# Patient Record
Sex: FEMALE | Employment: FULL TIME | URBAN - METROPOLITAN AREA
[De-identification: names, ages, dates, MRNs, and addresses within clinical notes are randomized per-mention and may not be internally consistent; named-entity substitution may affect disease eponyms.]

---

## 2020-01-21 ENCOUNTER — HOSPITAL ENCOUNTER (OUTPATIENT)
Dept: URGENT CARE | Facility: CLINIC | Age: 45
Discharge: HOME | End: 2020-01-21

## 2020-01-24 LAB — BACTERIA SPEC AEROBE CULT: NORMAL

## 2020-09-01 ENCOUNTER — OFFICE VISIT CONVERTED (OUTPATIENT)
Dept: SURGERY | Facility: CLINIC | Age: 45
End: 2020-09-01
Attending: SURGERY

## 2020-11-23 ENCOUNTER — OFFICE VISIT CONVERTED (OUTPATIENT)
Dept: GASTROENTEROLOGY | Facility: CLINIC | Age: 45
End: 2020-11-23
Attending: NURSE PRACTITIONER

## 2021-04-09 ENCOUNTER — OFFICE VISIT (OUTPATIENT)
Dept: OBSTETRICS AND GYNECOLOGY | Facility: CLINIC | Age: 46
End: 2021-04-09

## 2021-04-09 VITALS — DIASTOLIC BLOOD PRESSURE: 80 MMHG | WEIGHT: 142 LBS | HEART RATE: 60 BPM | SYSTOLIC BLOOD PRESSURE: 118 MMHG

## 2021-04-09 DIAGNOSIS — N95.1 HOT FLASHES DUE TO MENOPAUSE: Primary | ICD-10-CM

## 2021-04-09 LAB — TSH SERPL DL<=0.005 MIU/L-ACNC: 2.28 UIU/ML (ref 0.27–4.2)

## 2021-04-09 PROCEDURE — 99204 OFFICE O/P NEW MOD 45 MIN: CPT | Performed by: OBSTETRICS & GYNECOLOGY

## 2021-04-09 RX ORDER — MULTIPLE VITAMINS W/ MINERALS TAB 9MG-400MCG
1 TAB ORAL DAILY
COMMUNITY

## 2021-04-09 RX ORDER — ESTROGEN,CON/M-PROGEST ACET 0.3-1.5MG
1 TABLET ORAL DAILY
Qty: 84 TABLET | Refills: 0 | Status: SHIPPED | OUTPATIENT
Start: 2021-04-09 | End: 2021-06-14 | Stop reason: SDUPTHER

## 2021-04-09 NOTE — PROGRESS NOTES
Chief Complaint  Hot Flashes- pt here to talk about menopause    Subjective          Howard Kenney presents to [facility]  History of Present Illness  Patient is a 45-year-old -0-1-1 female who presents as a new patient to discuss possible menopause.  Patient reports a history of regular menstrual cycles and then last summer she went approximately 3 months without a cycle.  She states that she was given a course of hormones by her primary care provider and subsequently had bleeding for 2 months.  She states she has now not had a period since November.  She also reports hot flashes that are daily and occur multiple times a day.  She states they have significantly worsened over the last several months.  She denies any family history of early menopause.  She has no major medical problems.  She is currently up-to-date with her screening Pap smear and mammogram.    Objective   Vital Signs:   /80   Pulse 60   Wt 64.4 kg (142 lb)     Physical Exam  Vitals reviewed.   Constitutional:       Appearance: Normal appearance.   Cardiovascular:      Rate and Rhythm: Normal rate and regular rhythm.   Pulmonary:      Effort: Pulmonary effort is normal.      Breath sounds: Normal breath sounds.   Neurological:      General: No focal deficit present.      Mental Status: She is alert. Mental status is at baseline.   Psychiatric:         Mood and Affect: Mood normal.         Behavior: Behavior normal.     Review of Systems   Endocrine: Positive for heat intolerance.   Genitourinary: Positive for menstrual problem.   All other systems reviewed and are negative.                Assessment and Plan    Diagnoses and all orders for this visit:    1. Hot flashes due to menopause (Primary)  -     Follicle Stimulating Hormone  -     Estradiol  -     TSH Rfx On Abnormal To Free T4  -     estrogen, conjugated,-medroxyprogesterone (Prempro) 0.3-1.5 MG per tablet; Take 1 tablet by mouth Daily.  Dispense: 84  tablet; Refill: 0    Patient's clinical symptoms do seem consistent with perimenopause.  The perimenopausal stage was discussed with her and explained that she is considered young for menopause.  Discussed hormone replacement therapy for treatment of hot flashes and that it will also help with bone health.  She was counseled on risk of hormone replacement therapy including risk of blood clots, heart attack, and stroke, but she is low risk for these adverse effects.  Labs have been ordered for further evaluation and she would like to start on hormone replacement therapy.  Prescription for Prempro was sent and she was advised to follow-up in 2 months.      Follow Up   Return in about 2 months (around 6/9/2021) for Recheck.  Patient was given instructions and counseling regarding her condition or for health maintenance advice. Please see specific information pulled into the AVS if appropriate.

## 2021-04-09 NOTE — PATIENT INSTRUCTIONS
Perimenopause    Perimenopause is the normal time of life before and after menstrual periods stop completely (menopause). Perimenopause can begin 2-8 years before menopause, and it usually lasts for 1 year after menopause. During perimenopause, the ovaries may or may not produce an egg.  What are the causes?  This condition is caused by a natural change in hormone levels that happens as you get older.  What increases the risk?  This condition is more likely to start at an earlier age if you have certain medical conditions or treatments, including:  · A tumor of the pituitary gland in the brain.  · A disease that affects the ovaries and hormone production.  · Radiation treatment for cancer.  · Certain cancer treatments, such as chemotherapy or hormone (anti-estrogen) therapy.  · Heavy smoking and excessive alcohol use.  · Family history of early menopause.  What are the signs or symptoms?  Perimenopausal changes affect each woman differently. Symptoms of this condition may include:  · Hot flashes.  · Night sweats.  · Irregular menstrual periods.  · Decreased sex drive.  · Vaginal dryness.  · Headaches.  · Mood swings.  · Depression.  · Memory problems or trouble concentrating.  · Irritability.  · Tiredness.  · Weight gain.  · Anxiety.  · Trouble getting pregnant.  How is this diagnosed?  This condition is diagnosed based on your medical history, a physical exam, your age, your menstrual history, and your symptoms. Hormone tests may also be done.  How is this treated?  In some cases, no treatment is needed. You and your health care provider should make a decision together about whether treatment is necessary. Treatment will be based on your individual condition and preferences. Various treatments are available, such as:  · Menopausal hormone therapy (MHT).  · Medicines to treat specific symptoms.  · Acupuncture.  · Vitamin or herbal supplements.  Before starting treatment, make sure to let your health care provider  know if you have a personal or family history of:  · Heart disease.  · Breast cancer.  · Blood clots.  · Diabetes.  · Osteoporosis.  Follow these instructions at home:  Lifestyle  · Do not use any products that contain nicotine or tobacco, such as cigarettes and e-cigarettes. If you need help quitting, ask your health care provider.  · Eat a balanced diet that includes fresh fruits and vegetables, whole grains, soybeans, eggs, lean meat, and low-fat dairy.  · Get at least 30 minutes of physical activity on 5 or more days each week.  · Avoid alcoholic and caffeinated beverages, as well as spicy foods. This may help prevent hot flashes.  · Get 7-8 hours of sleep each night.  · Dress in layers that can be removed to help you manage hot flashes.  · Find ways to manage stress, such as deep breathing, meditation, or journaling.  General instructions  · Keep track of your menstrual periods, including:  ? When they occur.  ? How heavy they are and how long they last.  ? How much time passes between periods.  · Keep track of your symptoms, noting when they start, how often you have them, and how long they last.  · Take over-the-counter and prescription medicines only as told by your health care provider.  · Take vitamin supplements only as told by your health care provider. These may include calcium, vitamin E, and vitamin D.  · Use vaginal lubricants or moisturizers to help with vaginal dryness and improve comfort during sex.  · Talk with your health care provider before starting any herbal supplements.  · Keep all follow-up visits as told by your health care provider. This is important. This includes any group therapy or counseling.  Contact a health care provider if:  · You have heavy vaginal bleeding or pass blood clots.  · Your period lasts more than 2 days longer than normal.  · Your periods are recurring sooner than 21 days.  · You bleed after having sex.  Get help right away if:  · You have chest pain, trouble  breathing, or trouble talking.  · You have severe depression.  · You have pain when you urinate.  · You have severe headaches.  · You have vision problems.  Summary  · Perimenopause is the time when a woman's body begins to move into menopause. This may happen naturally or as a result of other health problems or medical treatments.  · Perimenopause can begin 2-8 years before menopause, and it usually lasts for 1 year after menopause.  · Perimenopausal symptoms can be managed through medicines, lifestyle changes, and complementary therapies such as acupuncture.  This information is not intended to replace advice given to you by your health care provider. Make sure you discuss any questions you have with your health care provider.  Document Revised: 11/30/2018 Document Reviewed: 01/23/2018  Elsevier Patient Education © 2021 [employer] Inc[employer]

## 2021-04-10 LAB
ESTRADIOL SERPL-MCNC: 27.6 PG/ML
FSH SERPL-ACNC: 107 MIU/ML

## 2021-05-10 NOTE — H&P
History and Physical      Patient Name: Howard Kenney   Patient ID: [id number]   Sex: Female   YOB: 1975    Referring Provider: Jay Larson MD    Visit Date: 2020    Provider: COURT Gentile   Location: [facility]   Location Address: [address] [place] [address]  [place]  [id number]   Location Phone: [phone]          Chief Complaint  · Hepatic hemangiomas      History Of Present Illness  The patient is a 45 year old female, who presents on referral from Jay Larson MD, for a gastroenterology evaluation for hepatic hemangiomas.      45-year-old female presents today for the evaluation of hepatic hemangiomas.  MRI abdomen with and without contrast on 10/2020 revealed incidental hepatic lesions seen on recent MR corresponding to benign hemangiomas with multiple benign hemangioma seen.  Radiologist recommends no further imaging follow-up necessary.  There is background of fatty infiltration of the liver.  Patient reports her last liver enzymes were within normal limits.  She denies right upper quadrant pain, nausea, vomiting, jaundice, dark-colored urine, and fatigue.  She rarely drinks alcohol and she is not diabetic.       Past Medical History  Kidney stone         Past Surgical History  Breast fibroadenoma surgery;  section; Lipoma Removal         Medication List  multivitamin oral tablet         Allergy List  Lorabid; SULFA (SULFONAMIDES)       Allergies Reconciled  Family Medical History  *No Known Family History         Social History  Tobacco (Never)         Review of Systems  · Constitutional  o Denies  o : chills, fever  · Eyes  o Denies  o : blurred vision, changes in vision  · Cardiovascular  o Denies  o : chest pain  · Respiratory  o Denies  o : shortness of breath  · Gastrointestinal  o Denies  o : nausea, vomiting  · Genitourinary  o Denies  o : dysuria, blood in urine  · Integument  o Denies  o :  "rash  · Neurologic  o Denies  o : tingling or numbness  · Musculoskeletal  o Denies  o : joint pain  · Endocrine  o Denies  o : weight gain, weight loss  · Psychiatric  o Denies  o : anxiety, depression      Vitals  Date Time BP Position Site L\R Cuff Size HR RR TEMP (F) WT  HT  BMI kg/m2 BSA m2 O2 Sat FR L/min FiO2 HC       11/23/2020 10:35 /65 Sitting    61 - R 16  149lbs 0oz 5'  4\" 25.58 1.75 100 %            Physical Examination  · Constitutional  o Appearance  o : Well-nourished, well developed, alert, in no acute distress, alert and oriented X 3.  · Eyes  o Vision  o :   § Visual Fields  § : eyes move symmetrical in all directions  o Sclerae  o : sclerae anicteric  o Pupils and Irises  o : pupils equal and symetrical  · Neck  o Inspection/Palpation  o : Trachea is midline, no adneopathy  o Thyroid  o : Thyroid is not enlarged  · Respiratory  o Respiratory Effort  o : Breathing is unlabored.  o Inspection of Chest  o : normal appearance, no retractions  o Auscultation of Lungs  o : Chest is clear to auscultation bilaterally  · Cardiovascular  o Heart  o :   § Auscultation of Heart  § : no murmurs, rubs, or gallops  · Gastrointestinal  o Abdominal Examination  o : Abdomen is soft, nontender to palpation, with normal active bowel sounds, no appreciable hepatosplenomegaly.  · Genitourinary  o Digital Rectal Examination  o : Deferred  · Skin and Subcutaneous Tissue  o General Inspection  o : Skin is without focal lesions. Skin turgor is normal.  · Psychiatric  o Mood and Affect  o : No obvious depression.          Assessment  · Hepatic hemangioma     228.04/D18.03      Plan  · Medications  o Medications have been Reconciled  o Transition of Care or Provider Policy  · Instructions  o 45-year-old female presenting today for the evaluation of hepatic hemangiomas. I have reviewed her MRI with the patient. We have discussed what hemangiomas are. The patient is asymptomatic. I am going to have her follow-up on an " as-needed basis.            Electronically Signed by: COURT Gentile -Author on November 23, 2020 10:53:30 AM  Electronically Co-signed by: Shamir Campuzano MD -Reviewer on December 16, 2020 04:26:27 PM

## 2021-05-10 NOTE — H&P
History and Physical      Patient Name: Howard Kenney   Patient ID: [id number]   Sex: Female   YOB: 1975    Primary Care Provider: Suad YUN   Referring Provider: Suad YUN    Visit Date: 2020    Provider: Song Lagunas MD   Location: [facility]   Location Address: [address] [place] [address]  [id number]   Location Phone: [phone]          Chief Complaint  · Lipoma      History Of Present Illness  Howard Kenney is a 45 year old female who presents to the office today as a consult from Suad YUN.      The patient was referred for consideration for removal of the process cutaneous nodules from her lower back.  The patient has been having some pain shooting down 1 of her legs and when she palpates her lower back on both sides she can feel some small subcutaneous nodules that are tender when she palpates the nodules.  Patient has a history of having a lipoma removed from her right proximal arm.  She is also had some fibroids removed.   She wants to know my opinion about whether the nodules she can feel in her lower back could be causing the pain radiating down her leg and she is wondering whether the nodules could be cancer or something other than benign nodules.  Patient also sometimes has pain at her lower back when she is laying down.  MRI was done of the lumbar spine and the MRI showed disc bulging at the L4-5 area with a small annular tear along the far right posterior lateral margin of the disc.       Past Surgical History  Procedure Name Date Notes   Breast fibroadenoma surgery --  --     section --  --    Lipoma Removal --  --          Medication List  Name Date Started Instructions   multivitamin oral tablet  take 1 tablet by oral route daily         Allergy List  Allergen Name Date Reaction Notes   Lorabid --  --  --    SULFA (SULFONAMIDES) --  --  --        Allergies Reconciled  Family Medical History  Disease Name  "Relative/Age Notes   *No Known Family History  --          Social History  Finding Status Start/Stop Quantity Notes   Tobacco Never --/-- --  --          Review of Systems  · Constitutional  o Denies  o : chills, fever  · Gastrointestinal  o Denies  o : nausea, vomiting      Vitals  Date Time BP Position Site L\R Cuff Size HR RR TEMP (F) WT  HT  BMI kg/m2 BSA m2 O2 Sat HC       09/01/2020 09:27 AM       16  143lbs 0oz 5'  4.5\" 24.17 1.72           Physical Examination  · Constitutional  o Appearance  o : healthy appearing, alert and in no acute distress, reliable historian, here alone  · Head and Face  o Head  o :   § Inspection  § : no visable deformities or lesions  · Eyes  o Conjunctivae  o : clear  o Sclerae  o : clear  · Neck  o Inspection/Palpation  o : normal appearance, no masses, trachea midline  · Respiratory  o Respiratory Effort  o : breathing unlabored, respiratory effort appears normal  o Inspection of Chest  o : normal appearance, no retractions  · Gastrointestinal  o Abdominal Examination  o :   § Abdomen  § : soft, nontender, nondistended  · Skin and Subcutaneous Tissue  o General Inspection  o : at the lower back on both the left and right sides, there are several palpable small, mobile, well circumscribed subcutaneous nodules. the nodules range in size from about one cm to 2-3 cms. mild tenderness when palpate the nodules deeply. tattoo present at lower back area.  · Neurologic  o Cranial Nerves  o : no obvious motor deficits  o Sensation  o : no obvious sensory deficits  o Gait and Station  o :   § Gait Screening  § : normal gait, able to stand without diffculty  o Cerebellar Function  o : no obvious abnormalities  · Psychiatric  o Judgement and Insight  o : judgment and insight intact  o Mood and Affect  o : mood normal, affect appropriate          Assessment  · Back pain     724.5/M54.9  · Subcutaneous nodule of back     782.2/R22.2      Plan  · Medications  o Medications have been " Reconciled  o Transition of Care or Provider Policy  · Instructions  o Electronically Identified Patient Education Materials Provided Electronically  · Referrals  o ID: [id number] Date: 08/28/2020 Type: Inbound  Specialty: General Surgery     The subcutaneous nodules at the patient's lower back have benign physical exam characteristics and are likely benign lipomas.  In my opinion, it is unlikely the nodules have anything to do with the pain that radiates down the patient's leg.  It is much more likely that pain is secondary to the patient's bulging disc seen on the MRI.  However, it is certainly possible the nodules are the cause of the patient's pain when she is laying down as this would place external pressure on the nodules and this could result in pain.  We discussed options of watchful observation and surgical excision.  Patient is going to wait until after she sees a specialist about her bulging lumbar disc and then decide whether she would like to have the nodules excised.  Plan at this time is watchful observation.             Electronically Signed by: Song Lagunas MD -Author on September 1, 2020 10:09:05 AM

## 2021-05-14 VITALS
RESPIRATION RATE: 16 BRPM | DIASTOLIC BLOOD PRESSURE: 65 MMHG | BODY MASS INDEX: 25.44 KG/M2 | SYSTOLIC BLOOD PRESSURE: 101 MMHG | WEIGHT: 149 LBS | OXYGEN SATURATION: 100 % | HEART RATE: 61 BPM | HEIGHT: 64 IN

## 2021-05-14 VITALS — BODY MASS INDEX: 24.41 KG/M2 | WEIGHT: 143 LBS | HEIGHT: 64 IN | RESPIRATION RATE: 16 BRPM

## 2021-06-14 ENCOUNTER — OFFICE VISIT (OUTPATIENT)
Dept: OBSTETRICS AND GYNECOLOGY | Facility: CLINIC | Age: 46
End: 2021-06-14

## 2021-06-14 VITALS
BODY MASS INDEX: 24.99 KG/M2 | WEIGHT: 145.6 LBS | HEART RATE: 71 BPM | SYSTOLIC BLOOD PRESSURE: 107 MMHG | DIASTOLIC BLOOD PRESSURE: 65 MMHG

## 2021-06-14 DIAGNOSIS — N95.1 HOT FLASHES DUE TO MENOPAUSE: Primary | ICD-10-CM

## 2021-06-14 PROCEDURE — 99213 OFFICE O/P EST LOW 20 MIN: CPT | Performed by: OBSTETRICS & GYNECOLOGY

## 2021-06-14 RX ORDER — ESTROGEN,CON/M-PROGEST ACET 0.3-1.5MG
1 TABLET ORAL DAILY
Qty: 84 TABLET | Refills: 3 | Status: SHIPPED | OUTPATIENT
Start: 2021-06-14 | End: 2022-06-13

## 2021-06-14 NOTE — PROGRESS NOTES
Chief Complaint  Follow-up- pt here for f/u for HRT     Subjective          Chuckdillonshyanne Kenney presents to [facility]  History of Present Illness  Patient is a 45-year-old female who presents for follow-up regarding Prempro.  She was recently seen for complaints of hot flashes and several months of amenorrhea.  An FSH done showed that she was postmenopausal and she was started on Prempro for symptoms.  She reports an improvement in her hot flashes.  She does report that after she started Prempro, she had 5 days of light bleeding and has not had any days of bleeding since then.  She just started her third month of Prempro.  She denies any other side effects.    Objective   Vital Signs:   /65   Pulse 71   Wt 66 kg (145 lb 9.6 oz)   BMI 24.99 kg/m²     Physical Exam  Vitals reviewed.   Constitutional:       Appearance: Normal appearance.   Cardiovascular:      Rate and Rhythm: Normal rate and regular rhythm.   Pulmonary:      Breath sounds: Normal breath sounds.   Neurological:      General: No focal deficit present.      Mental Status: She is alert. Mental status is at baseline.   Psychiatric:         Mood and Affect: Mood normal.         Behavior: Behavior normal.                 Assessment and Plan    Diagnoses and all orders for this visit:    1. Hot flashes due to menopause (Primary)  -     estrogen, conjugated,-medroxyprogesterone (Prempro) 0.3-1.5 MG per tablet; Take 1 tablet by mouth Daily.  Dispense: 84 tablet; Refill: 3    Symptoms have improved on Prempro and she has had no major side effects.  She will continue on Prempro and follow-up in 1 year for annual exam.  She was counseled on bleeding concerns that she should be seen for.    Follow Up   Return in about 1 year (around 6/14/2022) for Annual physical.  Patient was given instructions and counseling regarding her condition or for health maintenance advice. Please see specific information pulled into the AVS if  appropriate.

## 2022-06-08 ENCOUNTER — TELEPHONE (OUTPATIENT)
Dept: SURGERY | Facility: OTHER | Age: 47
End: 2022-06-08

## 2022-06-08 ENCOUNTER — TELEPHONE (OUTPATIENT)
Dept: OBSTETRICS AND GYNECOLOGY | Facility: CLINIC | Age: 47
End: 2022-06-08

## 2022-06-08 NOTE — TELEPHONE ENCOUNTER
Per HUB, pt has to reschedule annual/mammo from 6/15, due to a class she cannot miss.  Moving out of town on 7/2/22.  Pt also needs refill on prempro before she moves.      Dr. Navarro and Kathryn:  Can either of you offer an appt for annual exam before 7/2/22 at the [place] office?  Or possibly rx 2 months of estrogen, conjugated,-medroxyprogesterone (Prempro) 0.3-1.5 MG per tablet, to get her through until she can established with a new doctor.    Please advise on best way to proceed.        Pt # [phone]

## 2022-06-08 NOTE — TELEPHONE ENCOUNTER
Provider: DR. BEN AGUILERA  Caller: ROSA MARIA MOYER  Relationship to Patient: SELF  Pharmacy: [facility]   Phone Number: [phone]    Reason for Call: PT HAD TO CANCEL HER UPCOMING ANNUAL AND MAMMO DUE TO A CLASS SHE CANNOT MISS.  I TRIED TO RE-SCHEDULE HER FOR DR. AGUILERA AND VIOLETTE ENCINAS BUT PT IS MOVING OUT OF STATE ON 7/2/22 AND THERE WERE NO APPTS AVAILABLE.  PT IS INQUIRING ABOUT A REFILL ON HER PREPRO BEFORE SHE MOVES IF SHE CAN'T GET AN APPT.    When was the patient last seen: 6/14/2021    PLEASE CONTACT PT TO ADVISE.

## 2022-06-13 RX ORDER — ESTROGEN,CON/M-PROGEST ACET 0.3-1.5MG
1 TABLET ORAL DAILY
Qty: 90 TABLET | Refills: 0 | OUTPATIENT
Start: 2022-06-13 | End: 2022-06-28 | Stop reason: SDUPTHER

## 2022-06-13 NOTE — TELEPHONE ENCOUNTER
, are you able to see pt before 7/2/22, or are you ok to send rx to get her through until she finds another doctor?  See msg from HUB.    Please advise.     Pt # [phone]

## 2022-06-13 NOTE — TELEPHONE ENCOUNTER
Pt informed of 3 month supply of Prempro.  Advised to establish with a new obgyn as soon as possible so they can take over her prescriptions.

## 2022-06-13 NOTE — TELEPHONE ENCOUNTER
Pt cannot make it on 6/15/22, she is trying to reschedule that appt.  Kathryn does not have openings before 7/2/22 either.

## 2022-06-13 NOTE — TELEPHONE ENCOUNTER
I will send a 3-month supply of Prempro to her pharmacy to get her through until she can establish care with a new provider.

## 2022-06-13 NOTE — TELEPHONE ENCOUNTER
It appears that she is scheduled in 2 days.  If I do not have any openings before July 2, Kathryn stated in a phone message last week that she had openings before then.

## 2022-06-28 RX ORDER — ESTROGEN,CON/M-PROGEST ACET 0.3-1.5MG
1 TABLET ORAL DAILY
Qty: 90 TABLET | Refills: 0 | Status: SHIPPED | OUTPATIENT
Start: 2022-06-28 | End: 2022-06-28 | Stop reason: SDUPTHER

## 2022-06-28 RX ORDER — ESTROGEN,CON/M-PROGEST ACET 0.3-1.5MG
1 TABLET ORAL DAILY
Qty: 90 TABLET | Refills: 0 | Status: SHIPPED | OUTPATIENT
Start: 2022-06-28

## 2023-05-17 NOTE — TELEPHONE ENCOUNTER
Caller: Howard Kenney    Relationship: Self    Best call back number: [phone]    Requested Prescriptions:     estrogen, conjugated,-medroxyprogesterone (Prempro) 0.3-1.5 MG per tablet       Requested Prescriptions      No prescriptions requested or ordered in this encounter        Pharmacy where request should be sent:  PT'S PREFERRED PHARMACY    Additional details provided by patient: PT CALLED IN AS SHE HAD RECEIVED A NOTICE FROM [employer] THAT A PRESCRIPTION WAS READY HOWEVER THE TWO THAT SHE TYPICALLY GOES TO DO NOT SHOW THEY EVER RECEIVED A RX. 6/13/22 RX IN FILE DOES NOT SHOW CONFIRMATION FROM PHARMACY THAT THEY RECEIVED THE RX. PT REQUESTING IT BE RESENT.     THERE ARE TE NOTES SHOWING THAT A 3 MO SUPPLY WAS GOING TO BE SENT IN TO HOLD PT OFF UNTIL SHE IS ESTABLISHED WITH ANOTHER GYN ONCE SHE HAS MOVED.     Does the patient have less than a 3 day supply:  [] Yes  [x] No    Valentin Horne Rep   06/28/22 14:38 EDT         
[facility] call. Last seen 6/14/2021 Chuckmaviskameron is requesting a resend of the Pempro 90 day supply be resent to [facility] on file, as the pharmacy did not show confirmed on the note. She is moving and requesting a 90 day supply to hold her until she is established with another GYN once she has moved. Thank you.
I sent this to the [facility] in [place]  
Spoke with Howard to let her know that Dr Navarro has sent just now her Pempro prescription to you [facility] at [address] [facility] in [place], [place]. She said thank you.  
EMT/paramedic

## 2025-02-19 ENCOUNTER — TELEPHONE (OUTPATIENT)
Dept: OBSTETRICS AND GYNECOLOGY | Facility: CLINIC | Age: 50
End: 2025-02-19
Payer: OTHER GOVERNMENT

## 2025-02-19 NOTE — TELEPHONE ENCOUNTER
Pt called and SW [facility]:  PREVIOUSLY ESTABLISHED PATIENT WITH DR. LIZETH NAVARRO, LAST SEEN ON 06/14/2021.  INCOMING SELF REFERRAL FOR MENOPAUSAL SYMPTOMS.        [employer]  Referral to Dr. Lizeth Navarro, filed to chart.     Please advise if & when pt may be scheduled w/you.     Thanks,   Mary    Pt # [phone]

## 2025-02-25 ENCOUNTER — OFFICE VISIT (OUTPATIENT)
Dept: OBSTETRICS AND GYNECOLOGY | Facility: CLINIC | Age: 50
End: 2025-02-25
Payer: OTHER GOVERNMENT

## 2025-02-25 VITALS
HEART RATE: 66 BPM | HEIGHT: 64 IN | DIASTOLIC BLOOD PRESSURE: 76 MMHG | WEIGHT: 159.8 LBS | BODY MASS INDEX: 27.28 KG/M2 | SYSTOLIC BLOOD PRESSURE: 113 MMHG

## 2025-02-25 DIAGNOSIS — E28.319 PREMATURE MENOPAUSE ON HORMONE REPLACEMENT THERAPY: ICD-10-CM

## 2025-02-25 DIAGNOSIS — Z01.419 ENCOUNTER FOR GYNECOLOGICAL EXAMINATION: Primary | ICD-10-CM

## 2025-02-25 DIAGNOSIS — Z79.890 PREMATURE MENOPAUSE ON HORMONE REPLACEMENT THERAPY: ICD-10-CM

## 2025-02-25 RX ORDER — CARBOXYMETHYLCELLULOSE SODIUM 5 MG/ML
SOLUTION/ DROPS OPHTHALMIC
COMMUNITY
Start: 2025-02-24

## 2025-02-25 RX ORDER — CARBOXYMETHYLCELLULOSE SODIUM 10 MG/ML
GEL OPHTHALMIC
COMMUNITY
Start: 2025-02-24

## 2025-02-25 RX ORDER — LOTEPREDNOL ETABONATE 5 MG/ML
SUSPENSION/ DROPS OPHTHALMIC
COMMUNITY
Start: 2025-02-24 | End: 2025-02-25

## 2025-02-25 NOTE — PROGRESS NOTES
"Chief Complaint  Menopause- Pt c/o menopause symptoms such as fatigue, hot flashes and night sweats    Subjective        Howard Kenney presents to [facility]  History of Present Illness  Patient is a 49-year-old -0-1-1 who was previously seen by me in  for menopausal symptoms and absence of menses.  An [facility] at that time confirmed that patient was in menopause and she was started on HRT, which helped with her symptoms.  She did move to [place] for a period of time and stopped her hormone therapy and felt that she was having more issues with fatigue, hot flashes, and night sweats.  She was able to get a refill on her medication 2 weeks ago by her primary care provider and feels that the symptoms have improved since then.  She denies any postmenopausal bleeding.  She does have a family history of breast cancer in her mother and had a breast MRI and breast mammogram last summer.  She does plan to space the mammogram and MRI to every 6 month imaging.    Objective   Vital Signs:  /76   Pulse 66   Ht 162.6 cm (64\")   Wt 72.5 kg (159 lb 12.8 oz)   BMI 27.43 kg/m²   Estimated body mass index is 27.43 kg/m² as calculated from the following:    Height as of this encounter: 162.6 cm (64\").    Weight as of this encounter: 72.5 kg (159 lb 12.8 oz).          Physical Exam  Vitals reviewed. Exam conducted with a chaperone present.   Constitutional:       Appearance: She is well-developed.   Cardiovascular:      Rate and Rhythm: Normal rate and regular rhythm.   Pulmonary:      Effort: Pulmonary effort is normal.      Breath sounds: Normal breath sounds.   Chest:   Breasts:     Right: No inverted nipple, mass, nipple discharge, skin change or tenderness.      Left: No inverted nipple, mass, nipple discharge, skin change or tenderness.   Abdominal:      General: There is no distension.      Palpations: Abdomen is soft.      Tenderness: There is no abdominal tenderness. "   Genitourinary:     Labia:         Right: No rash, tenderness, lesion or injury.         Left: No rash, tenderness, lesion or injury.       Vagina: Normal.      Cervix: Normal.      Uterus: Normal.       Adnexa:         Right: No mass, tenderness or fullness.          Left: No mass, tenderness or fullness.     Neurological:      Mental Status: She is alert.        Result Review :                Assessment and Plan   Diagnoses and all orders for this visit:    1. Encounter for gynecological examination (Primary)  -     IGP, Apt HPV,rfx 16 / 18,45    2. Premature menopause on hormone replacement therapy    Patient will continue with yearly mammograms and breast MRIs due to family history of breast cancer.  She will continue with monthly self breast exams.  Patient will call when she needs refills on her hormone replacement therapy.  Due to her undergoing menopause at an early age, I recommend that she continue on this for bone health.  She was counseled on diet and exercise and that weightbearing exercise will also help with bone health.  She may follow-up in 1 year or sooner if needed.         Follow Up   Return in about 1 year (around 2/25/2026) for gynecological exam.  Patient was given instructions and counseling regarding her condition or for health maintenance advice. Please see specific information pulled into the AVS if appropriate.

## 2025-03-03 LAB
CYTOLOGIST CVX/VAG CYTO: NORMAL
CYTOLOGY CVX/VAG DOC CYTO: NORMAL
CYTOLOGY CVX/VAG DOC THIN PREP: NORMAL
DX ICD CODE: NORMAL
HPV I/H RISK 4 DNA CVX QL PROBE+SIG AMP: NEGATIVE
OTHER STN SPEC: NORMAL
SERVICE CMNT-IMP: NORMAL
STAT OF ADQ CVX/VAG CYTO-IMP: NORMAL